# Patient Record
Sex: FEMALE | Race: WHITE | NOT HISPANIC OR LATINO | URBAN - NONMETROPOLITAN AREA
[De-identification: names, ages, dates, MRNs, and addresses within clinical notes are randomized per-mention and may not be internally consistent; named-entity substitution may affect disease eponyms.]

---

## 2022-08-05 ENCOUNTER — OFFICE VISIT (OUTPATIENT)
Dept: URBAN - NONMETROPOLITAN AREA CLINIC 11 | Facility: CLINIC | Age: 62
End: 2022-08-05

## 2022-08-09 ENCOUNTER — DASHBOARD ENCOUNTERS (OUTPATIENT)
Age: 62
End: 2022-08-09

## 2022-08-09 ENCOUNTER — LAB OUTSIDE AN ENCOUNTER (OUTPATIENT)
Dept: URBAN - METROPOLITAN AREA TELEHEALTH 2 | Facility: TELEHEALTH | Age: 62
End: 2022-08-09

## 2022-08-09 ENCOUNTER — OFFICE VISIT (OUTPATIENT)
Dept: URBAN - METROPOLITAN AREA TELEHEALTH 2 | Facility: TELEHEALTH | Age: 62
End: 2022-08-09
Payer: COMMERCIAL

## 2022-08-09 VITALS — BODY MASS INDEX: 30.53 KG/M2 | HEIGHT: 66 IN | WEIGHT: 190 LBS

## 2022-08-09 DIAGNOSIS — Z80.0 FAMILY HISTORY OF COLON CANCER: ICD-10-CM

## 2022-08-09 DIAGNOSIS — Z86.010 PERSONAL HISTORY OF COLONIC POLYPS: ICD-10-CM

## 2022-08-09 PROBLEM — 312824007: Status: ACTIVE | Noted: 2022-08-09

## 2022-08-09 PROBLEM — 429699009: Status: ACTIVE | Noted: 2022-08-09

## 2022-08-09 PROCEDURE — 99203 OFFICE O/P NEW LOW 30 MIN: CPT | Performed by: INTERNAL MEDICINE

## 2022-08-09 RX ORDER — MONTELUKAST 10 MG/1
1 TABLET TABLET, FILM COATED ORAL ONCE A DAY
Status: ACTIVE | COMMUNITY

## 2022-08-09 RX ORDER — LEVOTHYROXINE SODIUM 100 UG/1
1 TABLET IN THE MORNING ON AN EMPTY STOMACH TABLET ORAL ONCE A DAY
Status: ACTIVE | COMMUNITY

## 2022-08-09 RX ORDER — HYDROCHLOROTHIAZIDE 25 MG/1
1 TABLET IN THE MORNING TABLET ORAL ONCE A DAY
Status: ACTIVE | COMMUNITY

## 2022-08-09 RX ORDER — CALCIUM CARBONATE 500(1250)
1 TABLET WITH MEALS TABLET ORAL TWICE A DAY
Status: ACTIVE | COMMUNITY

## 2022-08-09 RX ORDER — DEXTROSE 4 G
1 TABLET TABLET,CHEWABLE ORAL ONCE A DAY
Status: ACTIVE | COMMUNITY

## 2022-08-09 RX ORDER — PRAVASTATIN SODIUM 20 MG/1
1 TABLET TABLET ORAL ONCE A DAY
Status: ACTIVE | COMMUNITY

## 2022-08-09 RX ORDER — POTASSIUM CHLORIDE 1500 MG/1
1 TABLET WITH FOOD TABLET, EXTENDED RELEASE ORAL ONCE A DAY
Status: ACTIVE | COMMUNITY

## 2022-08-09 RX ORDER — AZELASTINE HYDROCHLORIDE 137 UG/1
1 PUFF IN EACH NOSTRIL SPRAY, METERED NASAL TWICE A DAY
Status: ACTIVE | COMMUNITY

## 2022-08-09 NOTE — HPI-TODAY'S VISIT:
62 year old female with a history of colon polyps and her mother  from colon cancer. Denies abdominal pain, changes in bowel habits, or blood in the stool. Her last colon was 3 years ago in [place]. She was going every 2 years but last colon showed les)s polyps than prior (3 polyps and recall was pushed to 3 years. The patient was referred by Dr. Fern Dewitt and a copy of this document will be sent to her PCP.

## 2022-10-01 PROBLEM — 428283002: Status: ACTIVE | Noted: 2022-08-09

## 2022-12-16 ENCOUNTER — OFFICE VISIT (OUTPATIENT)
Dept: URBAN - NONMETROPOLITAN AREA MEDICAL CENTER 6 | Facility: MEDICAL CENTER | Age: 62
End: 2022-12-16

## 2023-04-27 ENCOUNTER — WEB ENCOUNTER (OUTPATIENT)
Dept: URBAN - NONMETROPOLITAN AREA CLINIC 2 | Facility: CLINIC | Age: 63
End: 2023-04-27

## 2023-05-02 ENCOUNTER — OFFICE VISIT (OUTPATIENT)
Dept: URBAN - NONMETROPOLITAN AREA CLINIC 2 | Facility: CLINIC | Age: 63
End: 2023-05-02
Payer: COMMERCIAL

## 2023-05-02 ENCOUNTER — LAB OUTSIDE AN ENCOUNTER (OUTPATIENT)
Dept: URBAN - NONMETROPOLITAN AREA CLINIC 2 | Facility: CLINIC | Age: 63
End: 2023-05-02

## 2023-05-02 VITALS
DIASTOLIC BLOOD PRESSURE: 86 MMHG | HEART RATE: 58 BPM | WEIGHT: 190 LBS | HEIGHT: 66 IN | SYSTOLIC BLOOD PRESSURE: 134 MMHG | BODY MASS INDEX: 30.53 KG/M2

## 2023-05-02 DIAGNOSIS — Z80.0 FAMILY HISTORY OF COLON CANCER: ICD-10-CM

## 2023-05-02 DIAGNOSIS — Z86.010 PERSONAL HISTORY OF COLONIC POLYPS: ICD-10-CM

## 2023-05-02 PROCEDURE — 99213 OFFICE O/P EST LOW 20 MIN: CPT | Performed by: NURSE PRACTITIONER

## 2023-05-02 RX ORDER — LEVOTHYROXINE SODIUM 100 UG/1
1 TABLET IN THE MORNING ON AN EMPTY STOMACH TABLET ORAL ONCE A DAY
Status: ACTIVE | COMMUNITY

## 2023-05-02 RX ORDER — POTASSIUM CHLORIDE 1500 MG/1
1 TABLET WITH FOOD TABLET, EXTENDED RELEASE ORAL ONCE A DAY
Status: ACTIVE | COMMUNITY

## 2023-05-02 RX ORDER — MONTELUKAST 10 MG/1
1 TABLET TABLET, FILM COATED ORAL ONCE A DAY
Status: ACTIVE | COMMUNITY

## 2023-05-02 RX ORDER — PRAVASTATIN SODIUM 20 MG/1
1 TABLET TABLET ORAL ONCE A DAY
Status: ACTIVE | COMMUNITY

## 2023-05-02 RX ORDER — CALCIUM CARBONATE 500(1250)
1 TABLET WITH MEALS TABLET ORAL TWICE A DAY
Status: ACTIVE | COMMUNITY

## 2023-05-02 RX ORDER — AZELASTINE HYDROCHLORIDE 137 UG/1
1 PUFF IN EACH NOSTRIL SPRAY, METERED NASAL TWICE A DAY
Status: ACTIVE | COMMUNITY

## 2023-05-02 RX ORDER — SODIUM, POTASSIUM,MAG SULFATES 17.5-3.13G
177ML SOLUTION, RECONSTITUTED, ORAL ORAL
Qty: 1 | OUTPATIENT
Start: 2023-05-02 | End: 2023-05-04

## 2023-05-02 RX ORDER — DEXTROSE 4 G
1 TABLET TABLET,CHEWABLE ORAL ONCE A DAY
Status: ACTIVE | COMMUNITY

## 2023-05-02 RX ORDER — HYDROCHLOROTHIAZIDE 25 MG/1
1 TABLET IN THE MORNING TABLET ORAL ONCE A DAY
Status: ACTIVE | COMMUNITY

## 2023-05-02 NOTE — HPI-TODAY'S VISIT:
Ms. Navarro is a 63-year-old female with a past medical history of hypertension who presents for routine colonoscopy.  She is without any GI complaints.  Her mother passed away from colon cancer at age 70.  She typically has been getting colonoscopies about every 2 to 3 years as she usually has a.  Last was in [place] in 2019 with 2 TA and 1 SSA.  She was recommended to have a 3-year repeat and presents today to schedule this.  She is not on any anticoagulation. She relocated from [place] to [facility] with her  for custodial. Dario

## 2023-05-25 ENCOUNTER — OFFICE VISIT (OUTPATIENT)
Dept: URBAN - NONMETROPOLITAN AREA SURGERY CENTER 1 | Facility: SURGERY CENTER | Age: 63
End: 2023-05-25
Payer: COMMERCIAL

## 2023-05-25 DIAGNOSIS — Z86.010 ADENOMAS PERSONAL HISTORY OF COLONIC POLYPS: ICD-10-CM

## 2023-05-25 PROCEDURE — G8907 PT DOC NO EVENTS ON DISCHARG: HCPCS | Performed by: INTERNAL MEDICINE

## 2023-05-25 PROCEDURE — G0105 COLORECTAL SCRN; HI RISK IND: HCPCS | Performed by: INTERNAL MEDICINE
